# Patient Record
Sex: MALE | Race: WHITE | NOT HISPANIC OR LATINO | URBAN - METROPOLITAN AREA
[De-identification: names, ages, dates, MRNs, and addresses within clinical notes are randomized per-mention and may not be internally consistent; named-entity substitution may affect disease eponyms.]

---

## 2017-04-19 ENCOUNTER — EMERGENCY (EMERGENCY)
Facility: HOSPITAL | Age: 37
LOS: 1 days | Discharge: HOME | End: 2017-04-19
Attending: EMERGENCY MEDICINE
Payer: COMMERCIAL

## 2017-04-19 VITALS
OXYGEN SATURATION: 99 % | RESPIRATION RATE: 17 BRPM | SYSTOLIC BLOOD PRESSURE: 138 MMHG | HEART RATE: 86 BPM | DIASTOLIC BLOOD PRESSURE: 92 MMHG

## 2017-04-19 VITALS
SYSTOLIC BLOOD PRESSURE: 132 MMHG | HEIGHT: 73 IN | WEIGHT: 190.04 LBS | HEART RATE: 72 BPM | RESPIRATION RATE: 18 BRPM | DIASTOLIC BLOOD PRESSURE: 95 MMHG | OXYGEN SATURATION: 99 % | TEMPERATURE: 98 F

## 2017-04-19 DIAGNOSIS — F41.9 ANXIETY DISORDER, UNSPECIFIED: ICD-10-CM

## 2017-04-19 DIAGNOSIS — R07.9 CHEST PAIN, UNSPECIFIED: ICD-10-CM

## 2017-04-19 DIAGNOSIS — I10 ESSENTIAL (PRIMARY) HYPERTENSION: ICD-10-CM

## 2017-04-19 LAB
ANION GAP SERPL CALC-SCNC: 6 MMOL/L — SIGNIFICANT CHANGE UP (ref 5–17)
BASOPHILS # BLD AUTO: 0.1 K/UL — SIGNIFICANT CHANGE UP (ref 0–0.2)
BASOPHILS NFR BLD AUTO: 1.3 % — SIGNIFICANT CHANGE UP (ref 0–2)
BUN SERPL-MCNC: 14 MG/DL — SIGNIFICANT CHANGE UP (ref 7–18)
CALCIUM SERPL-MCNC: 9.4 MG/DL — SIGNIFICANT CHANGE UP (ref 8.4–10.5)
CHLORIDE SERPL-SCNC: 101 MMOL/L — SIGNIFICANT CHANGE UP (ref 96–108)
CO2 SERPL-SCNC: 30 MMOL/L — SIGNIFICANT CHANGE UP (ref 22–31)
CREAT SERPL-MCNC: 1.18 MG/DL — SIGNIFICANT CHANGE UP (ref 0.5–1.3)
EOSINOPHIL # BLD AUTO: 0.2 K/UL — SIGNIFICANT CHANGE UP (ref 0–0.5)
EOSINOPHIL NFR BLD AUTO: 2 % — SIGNIFICANT CHANGE UP (ref 0–6)
GLUCOSE SERPL-MCNC: 111 MG/DL — HIGH (ref 70–99)
HCT VFR BLD CALC: 48.7 % — SIGNIFICANT CHANGE UP (ref 39–50)
HGB BLD-MCNC: 15.9 G/DL — SIGNIFICANT CHANGE UP (ref 13–17)
LYMPHOCYTES # BLD AUTO: 1.7 K/UL — SIGNIFICANT CHANGE UP (ref 1–3.3)
LYMPHOCYTES # BLD AUTO: 20.7 % — SIGNIFICANT CHANGE UP (ref 13–44)
MCHC RBC-ENTMCNC: 30.2 PG — SIGNIFICANT CHANGE UP (ref 27–34)
MCHC RBC-ENTMCNC: 32.6 GM/DL — SIGNIFICANT CHANGE UP (ref 32–36)
MCV RBC AUTO: 92.6 FL — SIGNIFICANT CHANGE UP (ref 80–100)
MONOCYTES # BLD AUTO: 0.8 K/UL — SIGNIFICANT CHANGE UP (ref 0–0.9)
MONOCYTES NFR BLD AUTO: 9.4 % — SIGNIFICANT CHANGE UP (ref 2–14)
NEUTROPHILS # BLD AUTO: 5.6 K/UL — SIGNIFICANT CHANGE UP (ref 1.8–7.4)
NEUTROPHILS NFR BLD AUTO: 66.5 % — SIGNIFICANT CHANGE UP (ref 43–77)
PLATELET # BLD AUTO: 304 K/UL — SIGNIFICANT CHANGE UP (ref 150–400)
POTASSIUM SERPL-MCNC: 4.2 MMOL/L — SIGNIFICANT CHANGE UP (ref 3.5–5.3)
POTASSIUM SERPL-SCNC: 4.2 MMOL/L — SIGNIFICANT CHANGE UP (ref 3.5–5.3)
RBC # BLD: 5.27 M/UL — SIGNIFICANT CHANGE UP (ref 4.2–5.8)
RBC # FLD: 12.4 % — SIGNIFICANT CHANGE UP (ref 10.3–14.5)
SODIUM SERPL-SCNC: 137 MMOL/L — SIGNIFICANT CHANGE UP (ref 135–145)
TROPONIN I SERPL-MCNC: <0.015 NG/ML — SIGNIFICANT CHANGE UP (ref 0–0.04)
TROPONIN I SERPL-MCNC: <0.015 NG/ML — SIGNIFICANT CHANGE UP (ref 0–0.04)
WBC # BLD: 8.4 K/UL — SIGNIFICANT CHANGE UP (ref 3.8–10.5)
WBC # FLD AUTO: 8.4 K/UL — SIGNIFICANT CHANGE UP (ref 3.8–10.5)

## 2017-04-19 PROCEDURE — 99283 EMERGENCY DEPT VISIT LOW MDM: CPT | Mod: 25

## 2017-04-19 PROCEDURE — 93005 ELECTROCARDIOGRAM TRACING: CPT

## 2017-04-19 PROCEDURE — 71020: CPT | Mod: 26

## 2017-04-19 PROCEDURE — 80048 BASIC METABOLIC PNL TOTAL CA: CPT

## 2017-04-19 PROCEDURE — 84484 ASSAY OF TROPONIN QUANT: CPT

## 2017-04-19 PROCEDURE — 85027 COMPLETE CBC AUTOMATED: CPT

## 2017-04-19 PROCEDURE — 71046 X-RAY EXAM CHEST 2 VIEWS: CPT

## 2017-04-19 PROCEDURE — 99285 EMERGENCY DEPT VISIT HI MDM: CPT

## 2017-04-19 RX ADMIN — Medication 0.5 MILLIGRAM(S): at 16:15

## 2017-04-19 NOTE — ED PROVIDER NOTE - PROGRESS NOTE DETAILS
Patient became very anxious and shaky while waiting for results, given ativan, feels better, 2 negative troponins.

## 2017-04-19 NOTE — ED PROVIDER NOTE - MEDICAL DECISION MAKING DETAILS
38 y/o M pt hx of HTN, no family hx of CAD w/ burning chest pain for about 20 minutes, now resolved in ED. Normal vitals, exam and EKG. Will do 2 troponin rule out. 36 y/o M pt hx of HTN, no family hx of CAD w/ burning chest pain for about 20 minutes, now resolved in ED. Normal vitals, exam and EKG. Will do 2 troponin rule out. f/u with PMD, given copy of results.

## 2017-04-19 NOTE — ED ADULT NURSE NOTE - OBJECTIVE STATEMENT
Received pt from triage with c/o chest pain.. Pt is a/o x 3, cooperative. Denies SOB. Seen by MD. All blood works sent to lab.

## 2017-04-19 NOTE — ED PROVIDER NOTE - OBJECTIVE STATEMENT
38 y/o M pt w/ PMHx of HTN presents to the ED c/o chest pain. Pt states that he was walking this morning when he began to feel burning chest pain that lasted about 25 minutes. Denies radiation of pain, fever, chills, cough, shortness of breath or any other complaints. NKDA. Pt on aspirin.

## 2017-10-15 ENCOUNTER — EMERGENCY (EMERGENCY)
Facility: HOSPITAL | Age: 37
LOS: 0 days | Discharge: LEFT BEFORE DISCHARGE | End: 2017-10-16
Payer: COMMERCIAL

## 2017-10-15 VITALS
OXYGEN SATURATION: 99 % | SYSTOLIC BLOOD PRESSURE: 158 MMHG | DIASTOLIC BLOOD PRESSURE: 96 MMHG | WEIGHT: 195.11 LBS | TEMPERATURE: 98 F | RESPIRATION RATE: 20 BRPM | HEART RATE: 100 BPM | HEIGHT: 73 IN

## 2017-10-15 DIAGNOSIS — R07.9 CHEST PAIN, UNSPECIFIED: ICD-10-CM
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
## 2017-10-16 PROCEDURE — 93010 ELECTROCARDIOGRAM REPORT: CPT
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
## 2019-05-14 PROBLEM — Z00.00 ENCOUNTER FOR PREVENTIVE HEALTH EXAMINATION: Status: ACTIVE | Noted: 2019-05-14

## 2019-09-03 ENCOUNTER — APPOINTMENT (OUTPATIENT)
Dept: ENDOCRINOLOGY | Facility: CLINIC | Age: 39
End: 2019-09-03

## 2020-12-13 ENCOUNTER — EMERGENCY (EMERGENCY)
Facility: HOSPITAL | Age: 40
LOS: 1 days | Discharge: HOME | End: 2020-12-13
Attending: EMERGENCY MEDICINE
Payer: COMMERCIAL

## 2020-12-13 VITALS
RESPIRATION RATE: 20 BRPM | WEIGHT: 195.11 LBS | HEIGHT: 73 IN | TEMPERATURE: 98 F | HEART RATE: 78 BPM | SYSTOLIC BLOOD PRESSURE: 134 MMHG | DIASTOLIC BLOOD PRESSURE: 91 MMHG | OXYGEN SATURATION: 97 %

## 2020-12-13 PROCEDURE — 73610 X-RAY EXAM OF ANKLE: CPT | Mod: 26,LT

## 2020-12-13 PROCEDURE — 99283 EMERGENCY DEPT VISIT LOW MDM: CPT

## 2020-12-13 PROCEDURE — 90471 IMMUNIZATION ADMIN: CPT

## 2020-12-13 PROCEDURE — 73610 X-RAY EXAM OF ANKLE: CPT

## 2020-12-13 PROCEDURE — 99284 EMERGENCY DEPT VISIT MOD MDM: CPT | Mod: 25

## 2020-12-13 PROCEDURE — 90715 TDAP VACCINE 7 YRS/> IM: CPT

## 2020-12-13 PROCEDURE — 73630 X-RAY EXAM OF FOOT: CPT

## 2020-12-13 PROCEDURE — 73630 X-RAY EXAM OF FOOT: CPT | Mod: 26,LT

## 2020-12-13 RX ORDER — TETANUS TOXOID, REDUCED DIPHTHERIA TOXOID AND ACELLULAR PERTUSSIS VACCINE, ADSORBED 5; 2.5; 8; 8; 2.5 [IU]/.5ML; [IU]/.5ML; UG/.5ML; UG/.5ML; UG/.5ML
0.5 SUSPENSION INTRAMUSCULAR ONCE
Refills: 0 | Status: COMPLETED | OUTPATIENT
Start: 2020-12-13 | End: 2020-12-13

## 2020-12-13 RX ADMIN — TETANUS TOXOID, REDUCED DIPHTHERIA TOXOID AND ACELLULAR PERTUSSIS VACCINE, ADSORBED 0.5 MILLILITER(S): 5; 2.5; 8; 8; 2.5 SUSPENSION INTRAMUSCULAR at 18:18
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
## 2020-12-13 NOTE — ED PROVIDER NOTE - PATIENT PORTAL LINK FT
You can access the FollowMyHealth Patient Portal offered by [facility] by registering at the following website: [email or web address]. By joining [facility]’s FollowMyHealth portal, you will also be able to view your health information using other applications (apps) compatible with our system.

## 2020-12-13 NOTE — ED PROVIDER NOTE - CLINICAL SUMMARY MEDICAL DECISION MAKING FREE TEXT BOX
Based on exam and history likely [id number], possible facture, will xray. Patient declined analgesia at this time, will update tetanus

## 2020-12-13 NOTE — ED PROVIDER NOTE - PHYSICAL EXAMINATION
No deformity, +5 strength on plantar flexion, +2 PP, some mild erythema over the 1st metatarsal, no point tenderness

## 2020-12-13 NOTE — ED PROVIDER NOTE - PROGRESS NOTE DETAILS
Provided fracture monae for comfort. Pt is well appearing walking with steady gait, stable for discharge and follow up without fail with medical doctor. I had a detailed discussion with the patient and/or guardian regarding the historical points, exam findings, and any diagnostic results supporting the discharge diagnosis. Pt educated on care and need for follow up. Strict return instructions and red flag signs and symptoms discussed with patient. Questions answered. Pt shows understanding of discharge information and agrees to follow.

## 2021-12-11 ENCOUNTER — INPATIENT (INPATIENT)
Facility: HOSPITAL | Age: 41
LOS: 1 days | Discharge: HOME | DRG: 310 | End: 2021-12-13
Attending: INTERNAL MEDICINE | Admitting: INTERNAL MEDICINE
Payer: MEDICAID

## 2021-12-11 VITALS
WEIGHT: 199.96 LBS | RESPIRATION RATE: 16 BRPM | OXYGEN SATURATION: 99 % | TEMPERATURE: 97 F | HEIGHT: 73 IN | SYSTOLIC BLOOD PRESSURE: 114 MMHG | DIASTOLIC BLOOD PRESSURE: 88 MMHG | HEART RATE: 114 BPM

## 2021-12-11 DIAGNOSIS — I48.91 UNSPECIFIED ATRIAL FIBRILLATION: ICD-10-CM

## 2021-12-11 DIAGNOSIS — Z29.9 ENCOUNTER FOR PROPHYLACTIC MEASURES, UNSPECIFIED: ICD-10-CM

## 2021-12-11 DIAGNOSIS — N17.9 ACUTE KIDNEY FAILURE, UNSPECIFIED: ICD-10-CM

## 2021-12-11 DIAGNOSIS — Z98.890 OTHER SPECIFIED POSTPROCEDURAL STATES: Chronic | ICD-10-CM

## 2021-12-11 DIAGNOSIS — F41.9 ANXIETY DISORDER, UNSPECIFIED: ICD-10-CM

## 2021-12-11 DIAGNOSIS — I10 ESSENTIAL (PRIMARY) HYPERTENSION: ICD-10-CM

## 2021-12-11 DIAGNOSIS — F10.10 ALCOHOL ABUSE, UNCOMPLICATED: ICD-10-CM

## 2021-12-11 LAB
ALBUMIN SERPL ELPH-MCNC: 3.2 G/DL — LOW (ref 3.5–5)
ALP SERPL-CCNC: 65 U/L — SIGNIFICANT CHANGE UP (ref 40–120)
ALT FLD-CCNC: 56 U/L DA — SIGNIFICANT CHANGE UP (ref 10–60)
AMPHET UR-MCNC: NEGATIVE — SIGNIFICANT CHANGE UP
ANION GAP SERPL CALC-SCNC: 9 MMOL/L — SIGNIFICANT CHANGE UP (ref 5–17)
APPEARANCE UR: CLEAR — SIGNIFICANT CHANGE UP
APTT BLD: 30.8 SEC — SIGNIFICANT CHANGE UP (ref 27.5–35.5)
AST SERPL-CCNC: 40 U/L — SIGNIFICANT CHANGE UP (ref 10–40)
BACTERIA # UR AUTO: NEGATIVE /HPF — SIGNIFICANT CHANGE UP
BARBITURATES UR SCN-MCNC: NEGATIVE — SIGNIFICANT CHANGE UP
BASOPHILS # BLD AUTO: 0.06 K/UL — SIGNIFICANT CHANGE UP (ref 0–0.2)
BASOPHILS NFR BLD AUTO: 0.7 % — SIGNIFICANT CHANGE UP (ref 0–2)
BENZODIAZ UR-MCNC: POSITIVE
BILIRUB SERPL-MCNC: 0.5 MG/DL — SIGNIFICANT CHANGE UP (ref 0.2–1.2)
BILIRUB UR-MCNC: NEGATIVE — SIGNIFICANT CHANGE UP
BUN SERPL-MCNC: 11 MG/DL — SIGNIFICANT CHANGE UP (ref 7–18)
CALCIUM SERPL-MCNC: 9.1 MG/DL — SIGNIFICANT CHANGE UP (ref 8.4–10.5)
CHLORIDE SERPL-SCNC: 101 MMOL/L — SIGNIFICANT CHANGE UP (ref 96–108)
CO2 SERPL-SCNC: 26 MMOL/L — SIGNIFICANT CHANGE UP (ref 22–31)
COCAINE METAB.OTHER UR-MCNC: NEGATIVE — SIGNIFICANT CHANGE UP
COLOR SPEC: YELLOW — SIGNIFICANT CHANGE UP
CREAT SERPL-MCNC: 1.31 MG/DL — HIGH (ref 0.5–1.3)
DIFF PNL FLD: NEGATIVE — SIGNIFICANT CHANGE UP
EOSINOPHIL # BLD AUTO: 0.11 K/UL — SIGNIFICANT CHANGE UP (ref 0–0.5)
EOSINOPHIL NFR BLD AUTO: 1.4 % — SIGNIFICANT CHANGE UP (ref 0–6)
EPI CELLS # UR: NEGATIVE /HPF — SIGNIFICANT CHANGE UP
ETHANOL SERPL-MCNC: <3 MG/DL — SIGNIFICANT CHANGE UP (ref 0–10)
GLUCOSE SERPL-MCNC: 130 MG/DL — HIGH (ref 70–99)
GLUCOSE UR QL: NEGATIVE — SIGNIFICANT CHANGE UP
HCT VFR BLD CALC: 44.9 % — SIGNIFICANT CHANGE UP (ref 39–50)
HGB BLD-MCNC: 15 G/DL — SIGNIFICANT CHANGE UP (ref 13–17)
IMM GRANULOCYTES NFR BLD AUTO: 0.5 % — SIGNIFICANT CHANGE UP (ref 0–1.5)
INR BLD: 0.96 RATIO — SIGNIFICANT CHANGE UP (ref 0.88–1.16)
KETONES UR-MCNC: ABNORMAL
LEUKOCYTE ESTERASE UR-ACNC: NEGATIVE — SIGNIFICANT CHANGE UP
LIDOCAIN IGE QN: 81 U/L — SIGNIFICANT CHANGE UP (ref 73–393)
LYMPHOCYTES # BLD AUTO: 1.25 K/UL — SIGNIFICANT CHANGE UP (ref 1–3.3)
LYMPHOCYTES # BLD AUTO: 15.4 % — SIGNIFICANT CHANGE UP (ref 13–44)
MAGNESIUM SERPL-MCNC: 2.1 MG/DL — SIGNIFICANT CHANGE UP (ref 1.6–2.6)
MCHC RBC-ENTMCNC: 31 PG — SIGNIFICANT CHANGE UP (ref 27–34)
MCHC RBC-ENTMCNC: 33.4 GM/DL — SIGNIFICANT CHANGE UP (ref 32–36)
MCV RBC AUTO: 92.8 FL — SIGNIFICANT CHANGE UP (ref 80–100)
METHADONE UR-MCNC: NEGATIVE — SIGNIFICANT CHANGE UP
MONOCYTES # BLD AUTO: 0.97 K/UL — HIGH (ref 0–0.9)
MONOCYTES NFR BLD AUTO: 12 % — SIGNIFICANT CHANGE UP (ref 2–14)
NEUTROPHILS # BLD AUTO: 5.67 K/UL — SIGNIFICANT CHANGE UP (ref 1.8–7.4)
NEUTROPHILS NFR BLD AUTO: 70 % — SIGNIFICANT CHANGE UP (ref 43–77)
NITRITE UR-MCNC: NEGATIVE — SIGNIFICANT CHANGE UP
NRBC # BLD: 0 /100 WBCS — SIGNIFICANT CHANGE UP (ref 0–0)
OPIATES UR-MCNC: NEGATIVE — SIGNIFICANT CHANGE UP
PCP SPEC-MCNC: SIGNIFICANT CHANGE UP
PCP UR-MCNC: NEGATIVE — SIGNIFICANT CHANGE UP
PH UR: 5 — SIGNIFICANT CHANGE UP (ref 5–8)
PLATELET # BLD AUTO: 222 K/UL — SIGNIFICANT CHANGE UP (ref 150–400)
POTASSIUM SERPL-MCNC: 4.1 MMOL/L — SIGNIFICANT CHANGE UP (ref 3.5–5.3)
POTASSIUM SERPL-SCNC: 4.1 MMOL/L — SIGNIFICANT CHANGE UP (ref 3.5–5.3)
PROT SERPL-MCNC: 6.9 G/DL — SIGNIFICANT CHANGE UP (ref 6–8.3)
PROT UR-MCNC: 15
PROTHROM AB SERPL-ACNC: 11.4 SEC — SIGNIFICANT CHANGE UP (ref 10.6–13.6)
RBC # BLD: 4.84 M/UL — SIGNIFICANT CHANGE UP (ref 4.2–5.8)
RBC # FLD: 13.2 % — SIGNIFICANT CHANGE UP (ref 10.3–14.5)
RBC CASTS # UR COMP ASSIST: SIGNIFICANT CHANGE UP /HPF (ref 0–2)
SARS-COV-2 RNA SPEC QL NAA+PROBE: SIGNIFICANT CHANGE UP
SODIUM SERPL-SCNC: 136 MMOL/L — SIGNIFICANT CHANGE UP (ref 135–145)
SP GR SPEC: 1.01 — SIGNIFICANT CHANGE UP (ref 1.01–1.02)
T3 SERPL-MCNC: 80 NG/DL — SIGNIFICANT CHANGE UP (ref 80–200)
T4 AB SER-ACNC: 6.7 UG/DL — SIGNIFICANT CHANGE UP (ref 4.6–12)
THC UR QL: NEGATIVE — SIGNIFICANT CHANGE UP
TROPONIN I, HIGH SENSITIVITY RESULT: 6.2 NG/L — SIGNIFICANT CHANGE UP
TSH SERPL-MCNC: 2.45 UU/ML — SIGNIFICANT CHANGE UP (ref 0.34–4.82)
UROBILINOGEN FLD QL: NEGATIVE — SIGNIFICANT CHANGE UP
WBC # BLD: 8.1 K/UL — SIGNIFICANT CHANGE UP (ref 3.8–10.5)
WBC # FLD AUTO: 8.1 K/UL — SIGNIFICANT CHANGE UP (ref 3.8–10.5)
WBC UR QL: SIGNIFICANT CHANGE UP /HPF (ref 0–5)

## 2021-12-11 PROCEDURE — 71045 X-RAY EXAM CHEST 1 VIEW: CPT | Mod: 26

## 2021-12-11 PROCEDURE — 99291 CRITICAL CARE FIRST HOUR: CPT

## 2021-12-11 RX ORDER — METOPROLOL TARTRATE 50 MG
5 TABLET ORAL ONCE
Refills: 0 | Status: COMPLETED | OUTPATIENT
Start: 2021-12-11 | End: 2021-12-11

## 2021-12-11 RX ORDER — HEPARIN SODIUM 5000 [USP'U]/ML
5000 INJECTION INTRAVENOUS; SUBCUTANEOUS EVERY 8 HOURS
Refills: 0 | Status: DISCONTINUED | OUTPATIENT
Start: 2021-12-11 | End: 2021-12-11

## 2021-12-11 RX ORDER — ALPRAZOLAM 0.25 MG
0.25 TABLET ORAL THREE TIMES A DAY
Refills: 0 | Status: DISCONTINUED | OUTPATIENT
Start: 2021-12-11 | End: 2021-12-13

## 2021-12-11 RX ORDER — ASPIRIN/CALCIUM CARB/MAGNESIUM 324 MG
81 TABLET ORAL DAILY
Refills: 0 | Status: DISCONTINUED | OUTPATIENT
Start: 2021-12-11 | End: 2021-12-13

## 2021-12-11 RX ORDER — ASPIRIN/CALCIUM CARB/MAGNESIUM 324 MG
162 TABLET ORAL ONCE
Refills: 0 | Status: COMPLETED | OUTPATIENT
Start: 2021-12-11 | End: 2021-12-11

## 2021-12-11 RX ORDER — METOPROLOL TARTRATE 50 MG
50 TABLET ORAL
Refills: 0 | Status: DISCONTINUED | OUTPATIENT
Start: 2021-12-11 | End: 2021-12-13

## 2021-12-11 RX ORDER — ACETAMINOPHEN 500 MG
650 TABLET ORAL EVERY 6 HOURS
Refills: 0 | Status: DISCONTINUED | OUTPATIENT
Start: 2021-12-11 | End: 2021-12-13

## 2021-12-11 RX ORDER — HEPARIN SODIUM 5000 [USP'U]/ML
1700 INJECTION INTRAVENOUS; SUBCUTANEOUS
Qty: 25000 | Refills: 0 | Status: DISCONTINUED | OUTPATIENT
Start: 2021-12-11 | End: 2021-12-11

## 2021-12-11 RX ORDER — ONDANSETRON 8 MG/1
4 TABLET, FILM COATED ORAL EVERY 6 HOURS
Refills: 0 | Status: DISCONTINUED | OUTPATIENT
Start: 2021-12-11 | End: 2021-12-13

## 2021-12-11 RX ORDER — ENOXAPARIN SODIUM 100 MG/ML
90 INJECTION SUBCUTANEOUS EVERY 12 HOURS
Refills: 0 | Status: DISCONTINUED | OUTPATIENT
Start: 2021-12-12 | End: 2021-12-13

## 2021-12-11 RX ORDER — SODIUM CHLORIDE 9 MG/ML
1000 INJECTION INTRAMUSCULAR; INTRAVENOUS; SUBCUTANEOUS
Refills: 0 | Status: DISCONTINUED | OUTPATIENT
Start: 2021-12-11 | End: 2021-12-13

## 2021-12-11 RX ORDER — ENOXAPARIN SODIUM 100 MG/ML
90 INJECTION SUBCUTANEOUS ONCE
Refills: 0 | Status: COMPLETED | OUTPATIENT
Start: 2021-12-11 | End: 2021-12-11

## 2021-12-11 RX ADMIN — Medication 162 MILLIGRAM(S): at 13:02

## 2021-12-11 RX ADMIN — Medication 0.5 MILLIGRAM(S): at 15:10

## 2021-12-11 RX ADMIN — Medication 50 MILLIGRAM(S): at 18:29

## 2021-12-11 RX ADMIN — ENOXAPARIN SODIUM 90 MILLIGRAM(S): 100 INJECTION SUBCUTANEOUS at 13:29

## 2021-12-11 RX ADMIN — Medication 5 MILLIGRAM(S): at 15:10

## 2021-12-11 NOTE — H&P ADULT - NSHPLABSRESULTS_GEN_ALL_CORE
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 Lives alone, w/ son on weekends. Denies smoking or illict drug use, recent increase in alcohol use d/t break up >3 drinks/day.

## 2021-12-11 NOTE — ED PROVIDER NOTE - NSICDXPASTMEDICALHX_GEN_ALL_CORE_FT
PAST MEDICAL HISTORY:  HTN (hypertension)      PAST MEDICAL HISTORY:  HTN (hypertension)       Anxiety

## 2021-12-11 NOTE — H&P ADULT - PROBLEM SELECTOR PLAN 1
Pt p/w palpitations  Afib on EKG   on Metoprolol 50mg BID at home   CHADsVasc = 1 for HTN  f/u Echo   Cardio Consulted Pt p/w palpitations  Afib on EKG   on Metoprolol 50mg BID at home   TSH, T4 wnl  CHADsVasc = 1 for HTN  f/u A1c and lipid panel  f/u Echo   c/w FD AC for now until echo, and AM labs for DM come back as he might have higher   Cardio Bethanie Consulted Pt p/w palpitations  Afib on EKG   admit to tele   on Metoprolol 50mg BID at home   TSH, T4 wnl  CHADsVasc = 1 for HTN  f/u A1c and lipid panel  f/u Echo   c/w FD AC for now until echo, and AM labs for DM come back as he might have higher   Cardio Bethanie Consulted

## 2021-12-11 NOTE — CHART NOTE - NSCHARTNOTEFT_GEN_A_CORE
EVENT: Pt concerned about LOS since he is uninsured and due to start a new job on Monday with insurance kicking in thereafter    BRIEF HPI: 41 year old male, with PMH of HTN and anxiety, who presented to the ED due to palpitations. Patient was lying in bed watching TV with his son when he started feeling his heart beat very fast. His apple watch told him he his heart rate was 180s-190s and that he is in Afib. He also began feeling short of breath that he attributes to anxiety. Patient denies headache, dizziness, chest pain, n/v, or  complaints but does endorse some anxiety-related diarrhea. Pt has had on and off episodes of palpitations since september. He called EMS on a previous occasion but his symptoms resolved and EKG was normal by the time they arrived. 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 Pt is vaccinated for COVID-19.       OBJECTIVE:  Vital Signs Last 24 Hrs  T(C): 36.7 (11 Dec 2021 15:00), Max: 36.7 (11 Dec 2021 15:00)  T(F): 98 (11 Dec 2021 15:00), Max: 98 (11 Dec 2021 15:00)  HR: 104 (11 Dec 2021 19:15) (104 - 128)  BP: 105/64 (11 Dec 2021 19:15) (105/64 - 117/84)  BP(mean): --  RR: 18 (11 Dec 2021 15:00) (16 - 18)  SpO2: 98% (11 Dec 2021 15:00) (98% - 99%)    FOCUSED PHYSICAL EXAM:    LABS:                        15.0   8.10  )-----------( 222      ( 11 Dec 2021 12:48 )             44.9     12-11    136  |  101  |  11  ----------------------------<  130<H>  4.1   |  26  |  1.31<H>    Ca    9.1      11 Dec 2021 12:48  Mg     2.1     12-11    TPro  6.9  /  Alb  3.2<L>  /  TBili  0.5  /  DBili  x   /  AST  40  /  ALT  56  /  AlkPhos  65  12-11      EKG:   IMGAGING:    ASSESSMENT:  HPI:  Patient is a 41 year old male, with PMH of HTN and anxiety, who presented to the ED due to palpitations. Patient was lying in bed watching TV with his son when he started feeling his heart beat very fast. His apple watch told him he his heart rate was 180s-190s and that he is in Afib. He also began feeling short of breath that he attributes to anxiety. Patient denies headache, dizziness, chest pain, n/v, or  complaints but does endorse some anxiety-related diarrhea. Pt has had on and off episodes of palpitations since september. He called EMS on a previous occasion but his symptoms resolved and EKG was normal by the time they arrived. 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 Pt is vaccinated for COVID-19.     In ED:  EKG shows AFib  Pt tachycardic to 140s   vitals otherwise wnl  (11 Dec 2021 17:21)      PLAN:     FOLLOW UP / RESULT: EVENT: Pt concerned about hospitalization days since he is uninsured and due to start a new job on Monday with insurance kicking in thereafter.    BRIEF HPI: 41 year old male, with PMH of HTN and anxiety, who presented to the ED due to palpitations. Patient was lying in bed watching TV with his son when he started feeling his heart beat very fast. His apple watch told him he his heart rate was 180s-190s and that he is in Afib. He also began feeling short of breath that he attributes to anxiety. Patient denies headache, dizziness, chest pain, n/v, or  complaints but does endorse some anxiety-related diarrhea. Pt has had on and off episodes of palpitations since september. He called EMS on a previous occasion but his symptoms resolved and EKG was normal by the time they arrived. 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 Pt is vaccinated for COVID-19.     OBJECTIVE:  Vital Signs Last 24 Hrs  T(C): 36.7 (11 Dec 2021 15:00), Max: 36.7 (11 Dec 2021 15:00)  T(F): 98 (11 Dec 2021 15:00), Max: 98 (11 Dec 2021 15:00)  HR: 104 (11 Dec 2021 19:15) (104 - 128)  BP: 105/64 (11 Dec 2021 19:15) (105/64 - 117/84)  BP(mean): --  RR: 18 (11 Dec 2021 15:00) (16 - 18)  SpO2: 98% (11 Dec 2021 15:00) (98% - 99%)    FOCUSED PHYSICAL EXAM:  CV: S1 S2, irregular, Afib  RESP: Even, unlabored  NEURO: Alert, oriented X 4    LABS:                        15.0   8.10  )-----------( 222      ( 11 Dec 2021 12:48 )             44.9     12-11    136  |  101  |  11  ----------------------------<  130<H>  4.1   |  26  |  1.31<H>    Ca    9.1      11 Dec 2021 12:48  Mg     2.1     12-11    TPro  6.9  /  Alb  3.2<L>  /  TBili  0.5  /  DBili  x   /  AST  40  /  ALT  56  /  AlkPhos  65  12-11    Rhythm strip: 12/12/21  00:25 Afib MVR HR 86    PROBLEM: Plan of care discussed with pt regarding the importance of telemetry monitoring and  completing cardiac work up. ECHO is pending. Verbalized understanding but states the job that is scheduled to start on Monday is of utmost importance and he would have to sign AMA  PLAN:   1. ECHO completion  2. Cardiac monitoring    FOLLOW UP / RESULT: Echo results, Dr Kovacs cardio

## 2021-12-11 NOTE — ED PROVIDER NOTE - NSICDXPASTSURGICALHX_GEN_ALL_CORE_FT
PAST SURGICAL HISTORY:  S/P inguinal hernia repair      PAST SURGICAL HISTORY:  S/P inguinal hernia repair

## 2021-12-11 NOTE — H&P ADULT - PROBLEM SELECTOR PLAN 3
on 40mg lisnopril and 50mg metoprolol BID at home   hold lisnopril d/t EDNA  c/w home dose of metoprolol for now on 40mg lisnopril and 50mg metoprolol BID at home   hold lisnopril d/t EDNA  c/w home dose of metoprolol for now  consider adding another med if fore breakthrough htn on 40mg lisnopril and 50mg metoprolol BID at home   hold lisinopril d/t EDNA  c/w home dose of metoprolol for now  consider adding another med if fore breakthrough htn cr 1.31 on admission  unknown baseline  home lisinopril held   c/w IV fluids

## 2021-12-11 NOTE — H&P ADULT - HISTORY OF PRESENT ILLNESS
Patient is a 38 year old male, with PMH of HTN and anxiety, who presented to the ED due to palpitations.  Patient is a 41 year old male, with PMH of HTN and anxiety, who presented to the ED due to palpitations. Patient was lying in bed watching TV with his son when he started feeling his heart beat very fast. His apple watch told him he his heart rate was 180s-190s and that he is in Afib. He also began feeling short of breath that he attributes to anxiety. Patient denies headache, dizziness, chest pain, n/v, or  complaints but does endorse some anxiety-related diarrhea. Pt recently started drinking more (3 glasses of wine per night) as he recently went through a rough break up and is starting a new job which has caused him some stress. Pt is vaccinated for COVID-19.  Patient is a 41 year old male, with PMH of HTN and anxiety, who presented to the ED due to palpitations. Patient was lying in bed watching TV with his son when he started feeling his heart beat very fast. His apple watch told him he his heart rate was 180s-190s and that he is in Afib. He also began feeling short of breath that he attributes to anxiety. Patient denies headache, dizziness, chest pain, n/v, or  complaints but does endorse some anxiety-related diarrhea. Pt has had on and off episodes of palpitations since september. He called EMS on a previous occasion but his symptoms resolved and EKG was normal by the time they arrived. 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 Pt is vaccinated for COVID-19.     In ED:  EKG shows AFib  Pt tachycardic to 140s   vitals otherwise wnl  Patient is a 41 year old male, with PMH of HTN and anxiety, who presented to the ED due to palpitations. Patient was lying in bed watching TV with his son when he started feeling his heart beat very fast. His apple watch told him he his heart rate was 180s-190s and that he is in Afib. He also began feeling short of breath that he attributes to anxiety. Patient denies headache, dizziness, chest pain, n/v, or  complaints but does endorse some anxiety-related diarrhea. Pt has had on and off episodes of palpitations since september. He called EMS on a previous occasion but his symptoms resolved and EKG was normal by the time they arrived. 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 Pt is vaccinated for COVID-19.     In ED:  EKG shows AFib  Pt tachycardic to 140s   vitals otherwise wnl

## 2021-12-11 NOTE — ED PROVIDER NOTE - OBJECTIVE STATEMENT
41 year old male with PMHx of hypertension and anxiety and PSHx of inguinal hernia repair surgery presents to the ED with complaints of irregular and fast heart rate. The patient states that he was laying in bed with his son watching TV when his Apple watch alerted him that he had an irregular and fast heart rate and another dewayne notified him that he was in atrial fibrillation. He also reports having shortness of breath. He stats that he had an episode of a fib in September but that the symptoms subsided before EMS arrived. The patient reports that he recently went through a break and has been drinking more than usual as a result, with his last drink being three glasses of wine last night. The patient also reports that he has been vaccinated for COVID-19. He denies any chest pain at this time. NKDA. 41 year old male with PMHx of hypertension and anxiety and PSHx of inguinal hernia repair surgery presents to the ED with complaints of irregular and fast heart rate. The patient states that he was laying in bed with his son watching TV when his Apple watch alerted him that he had an irregular and fast heart rate and another dewayne notified him that he was in atrial fibrillation. He also reports having shortness of breath. He stats that he had an episode of a fib in September but that the symptoms subsided before EMS arrived. The patient reports that he recently went through a break and has been drinking more than usual as a result, with his last drink being three glasses of wine last night. The patient also reports that he has been vaccinated for COVID-19. He denies any chest pain at this time. NKDA.  Pt took Metoprolol 75mg this am, denies any cough.  Pt took Xanax .25mg this am 2/2 feeling anxious

## 2021-12-11 NOTE — H&P ADULT - ATTENDING COMMENTS
41 years old male with PMH of Alcohol Abuse,HTN and Anxiety is under lot of stress as  from his girl friend has been having palpitations off and On since september . Hemodynamically stable so will control ventricular rate with BB and already given Lovenox. TFT noted. Will put him on CIWA protocol and check TTE . Cards consulted. If needed will start him on Cardizem ggt.

## 2021-12-11 NOTE — H&P ADULT - ASSESSMENT
Patient is a 38 year old male, with PMH of HTN and anxiety, who presented to the ED due to palpitations.  Patient is a 41 year old male, with PMH of HTN and anxiety, who presented to the ED due to palpitations. Admitted to telemetry for new onset afib Patient is a 41 year old male, with PMH of HTN and anxiety, who presented to the ED due to palpitations. Admitted to telemetry for new onset afib.     EKG showing AFib with HR of 106. TSH normal at 2.45. UA negative. BAL negative.

## 2021-12-11 NOTE — H&P ADULT - PROBLEM SELECTOR PLAN 5
Heparin SubQ for DVT ppx FD Heparin for now, pt with EDNA  for DVT ppx FD Lovenox for now, pt with EDNA  for DVT ppx c/w home med of xanax PRN

## 2021-12-11 NOTE — H&P ADULT - NSHPPHYSICALEXAM_GEN_ALL_CORE
T(C): 36.7 (12-11-21 @ 15:00), Max: 36.7 (12-11-21 @ 15:00)  HR: 110 (12-11-21 @ 15:14) (110 - 128)  BP: 117/84 (12-11-21 @ 15:00) (114/88 - 117/84)  RR: 18 (12-11-21 @ 15:00) (16 - 18)  SpO2: 98% (12-11-21 @ 15:00) (98% - 99%)    GENERAL: patient appears well, no acute distress, appropriate, pleasant  EYES: sclera clear, no exudates  ENMT: oropharynx clear without erythema, no exudates, moist mucous membranes  NECK: supple, soft, no thyromegaly noted  LUNGS: good air entry bilaterally, clear to auscultation, symmetric breath sounds, no wheezing, rhonchi or rales appreciated  HEART: S1/S2, +Tachycardic +irregular, no murmurs noted, no lower extremity edema  GASTROINTESTINAL: abdomen is soft, nontender, nondistended, normoactive bowel sounds, no palpable masses  INTEGUMENT: good skin turgor, no lesions noted  MUSCULOSKELETAL: no clubbing or cyanosis, no obvious deformity  NEUROLOGIC: AAO x3, good muscle tone in 4 extremities, no obvious sensory deficits  PSYCHIATRIC: +anxious mood, affect is congruent, linear and logical thought process

## 2021-12-11 NOTE — H&P ADULT - PROBLEM SELECTOR PLAN 4
c/w home med of xanax PRN on 40mg lisnopril and 50mg metoprolol BID at home   hold lisinopril d/t EDNA  c/w home dose of metoprolol for now  consider adding another med if fore breakthrough htn

## 2021-12-11 NOTE — H&P ADULT - PROBLEM SELECTOR PLAN 2
cr 1.31 on admission  unknown baseline  lisinopril held   c/w IV fluids cr 1.31 on admission  unknown baseline  home lisinopril held   c/w IV fluids Pt drinks 6-9 drinks per day   CIWA protocol   Ativan 2q2 PRN   f/u social work

## 2021-12-11 NOTE — H&P ADULT - NSICDXFAMILYHX_GEN_ALL_CORE_FT
FAMILY HISTORY:  Father  Still living? Unknown  Family history of diabetes mellitus (DM), Age at diagnosis: Age Unknown  FH: HTN (hypertension), Age at diagnosis: Age Unknown    Mother  Still living? Unknown  FH: atrial fibrillation, Age at diagnosis: Age Unknown

## 2021-12-12 LAB
A1C WITH ESTIMATED AVERAGE GLUCOSE RESULT: 5.6 % — SIGNIFICANT CHANGE UP (ref 4–5.6)
ANION GAP SERPL CALC-SCNC: 8 MMOL/L — SIGNIFICANT CHANGE UP (ref 5–17)
BASOPHILS # BLD AUTO: 0.06 K/UL — SIGNIFICANT CHANGE UP (ref 0–0.2)
BASOPHILS NFR BLD AUTO: 0.6 % — SIGNIFICANT CHANGE UP (ref 0–2)
BUN SERPL-MCNC: 11 MG/DL — SIGNIFICANT CHANGE UP (ref 7–18)
CALCIUM SERPL-MCNC: 8.9 MG/DL — SIGNIFICANT CHANGE UP (ref 8.4–10.5)
CHLORIDE SERPL-SCNC: 102 MMOL/L — SIGNIFICANT CHANGE UP (ref 96–108)
CHOLEST SERPL-MCNC: 209 MG/DL — HIGH
CO2 SERPL-SCNC: 29 MMOL/L — SIGNIFICANT CHANGE UP (ref 22–31)
CREAT SERPL-MCNC: 1.11 MG/DL — SIGNIFICANT CHANGE UP (ref 0.5–1.3)
EOSINOPHIL # BLD AUTO: 0.06 K/UL — SIGNIFICANT CHANGE UP (ref 0–0.5)
EOSINOPHIL NFR BLD AUTO: 0.6 % — SIGNIFICANT CHANGE UP (ref 0–6)
ESTIMATED AVERAGE GLUCOSE: 114 MG/DL — SIGNIFICANT CHANGE UP (ref 68–114)
GLUCOSE SERPL-MCNC: 103 MG/DL — HIGH (ref 70–99)
HCT VFR BLD CALC: 47.7 % — SIGNIFICANT CHANGE UP (ref 39–50)
HDLC SERPL-MCNC: 64 MG/DL — SIGNIFICANT CHANGE UP
HGB BLD-MCNC: 15.6 G/DL — SIGNIFICANT CHANGE UP (ref 13–17)
IMM GRANULOCYTES NFR BLD AUTO: 0.7 % — SIGNIFICANT CHANGE UP (ref 0–1.5)
LIPID PNL WITH DIRECT LDL SERPL: 114 MG/DL — HIGH
LYMPHOCYTES # BLD AUTO: 1.91 K/UL — SIGNIFICANT CHANGE UP (ref 1–3.3)
LYMPHOCYTES # BLD AUTO: 20 % — SIGNIFICANT CHANGE UP (ref 13–44)
MAGNESIUM SERPL-MCNC: 2.6 MG/DL — SIGNIFICANT CHANGE UP (ref 1.6–2.6)
MCHC RBC-ENTMCNC: 30.7 PG — SIGNIFICANT CHANGE UP (ref 27–34)
MCHC RBC-ENTMCNC: 32.7 GM/DL — SIGNIFICANT CHANGE UP (ref 32–36)
MCV RBC AUTO: 93.9 FL — SIGNIFICANT CHANGE UP (ref 80–100)
MONOCYTES # BLD AUTO: 1.1 K/UL — HIGH (ref 0–0.9)
MONOCYTES NFR BLD AUTO: 11.5 % — SIGNIFICANT CHANGE UP (ref 2–14)
NEUTROPHILS # BLD AUTO: 6.33 K/UL — SIGNIFICANT CHANGE UP (ref 1.8–7.4)
NEUTROPHILS NFR BLD AUTO: 66.6 % — SIGNIFICANT CHANGE UP (ref 43–77)
NON HDL CHOLESTEROL: 145 MG/DL — HIGH
NRBC # BLD: 0 /100 WBCS — SIGNIFICANT CHANGE UP (ref 0–0)
PHOSPHATE SERPL-MCNC: 3.4 MG/DL — SIGNIFICANT CHANGE UP (ref 2.5–4.5)
PLATELET # BLD AUTO: 242 K/UL — SIGNIFICANT CHANGE UP (ref 150–400)
POTASSIUM SERPL-MCNC: 4 MMOL/L — SIGNIFICANT CHANGE UP (ref 3.5–5.3)
POTASSIUM SERPL-SCNC: 4 MMOL/L — SIGNIFICANT CHANGE UP (ref 3.5–5.3)
RBC # BLD: 5.08 M/UL — SIGNIFICANT CHANGE UP (ref 4.2–5.8)
RBC # FLD: 13.2 % — SIGNIFICANT CHANGE UP (ref 10.3–14.5)
SODIUM SERPL-SCNC: 139 MMOL/L — SIGNIFICANT CHANGE UP (ref 135–145)
TRIGL SERPL-MCNC: 157 MG/DL — HIGH
WBC # BLD: 9.53 K/UL — SIGNIFICANT CHANGE UP (ref 3.8–10.5)
WBC # FLD AUTO: 9.53 K/UL — SIGNIFICANT CHANGE UP (ref 3.8–10.5)

## 2021-12-12 RX ORDER — INFLUENZA VIRUS VACCINE 15; 15; 15; 15 UG/.5ML; UG/.5ML; UG/.5ML; UG/.5ML
0.5 SUSPENSION INTRAMUSCULAR ONCE
Refills: 0 | Status: DISCONTINUED | OUTPATIENT
Start: 2021-12-12 | End: 2021-12-13

## 2021-12-12 RX ADMIN — ENOXAPARIN SODIUM 90 MILLIGRAM(S): 100 INJECTION SUBCUTANEOUS at 14:44

## 2021-12-12 RX ADMIN — ENOXAPARIN SODIUM 90 MILLIGRAM(S): 100 INJECTION SUBCUTANEOUS at 02:31

## 2021-12-12 RX ADMIN — SODIUM CHLORIDE 80 MILLILITER(S): 9 INJECTION INTRAMUSCULAR; INTRAVENOUS; SUBCUTANEOUS at 00:31

## 2021-12-12 RX ADMIN — Medication 81 MILLIGRAM(S): at 12:19

## 2021-12-12 RX ADMIN — Medication 0.25 MILLIGRAM(S): at 19:52

## 2021-12-12 RX ADMIN — Medication 50 MILLIGRAM(S): at 17:22

## 2021-12-12 RX ADMIN — Medication 0.25 MILLIGRAM(S): at 09:29

## 2021-12-12 RX ADMIN — Medication 0.25 MILLIGRAM(S): at 00:10

## 2021-12-12 RX ADMIN — Medication 50 MILLIGRAM(S): at 07:31

## 2021-12-12 NOTE — PATIENT PROFILE ADULT - FALL HARM RISK - UNIVERSAL INTERVENTIONS
Bed in lowest position, wheels locked, appropriate side rails in place/Call bell, personal items and telephone in reach/Instruct patient to call for assistance before getting out of bed or chair/Non-slip footwear when patient is out of bed/[place] to call system/Physically safe environment - no spills, clutter or unnecessary equipment/Purposeful Proactive Rounding/Room/bathroom lighting operational, light cord in reach

## 2021-12-12 NOTE — PROGRESS NOTE ADULT - ASSESSMENT
41 year old male, with PMH of HTN and anxiety, who presented to the ED due to palpitations. Admitted to telemetry for new onset afib.     EKG showing AFib with HR of 106. TSH normal at 2.45. UA negative. BAL negative.      Problem/Plan - 1:  ·  Problem: New onset atrial fibrillation.   ·  Plan: Pt p/w palpitations  Afib on EKG   admit to tele   on Metoprolol 50mg BID at home   TSH, T4 wnl  CHADsVasc = 1 for HTN  TTE and NST pending.   Cardio Bethanie Consulted.     Problem/Plan - 2:  ·  Problem: Alcohol abuse.   ·  Plan: Pt drinks 6-9 drinks per day   CIWA protocol   Ativan 2q2 PRN   f/u social work.     Problem/Plan - 3:  ·  Problem: EDNA (acute kidney injury).   ·  Plan: cr 1.31 on admission  unknown baseline  home lisinopril held   c/w IV fluids.     Problem/Plan - 4:  ·  Problem: HTN (hypertension).   ·  Plan: on 40mg lisnopril and 50mg metoprolol BID at home   hold lisinopril d/t EDNA  c/w home dose of metoprolol for now  consider adding another med if fore breakthrough htn.     Problem/Plan - 5:  ·  Problem: Anxiety.   ·  Plan: c/w home med of xanax PRN.     Problem/Plan - 6:  ·  Problem: Need for prophylactic measure.   ·  Plan: FD Lovenox for now, pt with EDNA  for DVT ppx.      DC planning home after above tests.

## 2021-12-12 NOTE — CONSULT NOTE ADULT - SUBJECTIVE AND OBJECTIVE BOX
C A R D I O L O G Y  *********************    DATE OF SERVICE: 12-12-21    HISTORY OF PRESENT ILLNESS: HPI:  Patient is a 41 year old male, with PMH of HTN and anxiety, who presented to the ED due to palpitations. Patient was lying in bed watching TV with his son when he started feeling his heart beat very fast. His apple watch told him he his heart rate was 180s-190s and that he is in Afib. He also began feeling short of breath that he attributes to anxiety. Patient denies headache, dizziness, chest pain, n/v, or  complaints but does endorse some anxiety-related diarrhea. Pt has had on and off episodes of palpitations since september. He called EMS on a previous occasion but his symptoms resolved and EKG was normal by the time they arrived. 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 Pt is vaccinated for COVID-19.   No CP NO LOC    In ED:  EKG shows AFib  Pt tachycardic to 140s   vitals otherwise wnl  (11 Dec 2021 17:21)      PAST MEDICAL & SURGICAL HISTORY:  HTN (hypertension)    Anxiety    S/P inguinal hernia repair            MEDICATIONS:  MEDICATIONS  (STANDING):  aspirin  chewable 81 milliGRAM(s) Oral daily  enoxaparin Injectable 90 milliGRAM(s) SubCutaneous every 12 hours  influenza   Vaccine 0.5 milliLiter(s) IntraMuscular once  metoprolol tartrate 50 milliGRAM(s) Oral two times a day  sodium chloride 0.9%. 1000 milliLiter(s) (80 mL/Hr) IV Continuous <Continuous>      Allergies    No Known Allergies    Intolerances        FAMILY HISTORY:  Family history of diabetes mellitus (DM) (Father)    FH: HTN (hypertension) (Father)    FH: atrial fibrillation (Mother)      Non-contributary for premature coronary disease or sudden cardiac death    SOCIAL HISTORY:    [x ] Non-smoker  [ ] Smoker  [X ] Alcohol      REVIEW OF SYSTEMS:  [ ]chest pain  [  ]shortness of breath  [  ]palpitations  [  ]syncope  [ ]near syncope [ ]upper extremity weakness   [ ] lower extremity weakness  [  ]diplopia  [  ]altered mental status   [  ]fevers  [ ]chills [ ]nausea  [ ]vomitting  [  ]dysphagia    [ ]abdominal pain  [ ]melena  [ ]BRBPR    [  ]epistaxis  [  ]rash    [ ]lower extremity edema        [X] All others negative	  [ ] Unable to obtain      LABS:	 	    CARDIAC MARKERS:        Troponin I, High Sensitivity Result: 6.2 ng/L (12-11-21 @ 12:48)                            15.6   9.53  )-----------( 242      ( 12 Dec 2021 07:55 )             47.7     Hb Trend: 15.6<--    12-12    139  |  102  |  11  ----------------------------<  103<H>  4.0   |  29  |  1.11    Ca    8.9      12 Dec 2021 07:55  Phos  3.4     12-12  Mg     2.6     12-12    TPro  6.9  /  Alb  3.2<L>  /  TBili  0.5  /  DBili  x   /  AST  40  /  ALT  56  /  AlkPhos  65  12-11    Creatinine Trend: 1.11<--, 1.31<--        PHYSICAL EXAM:  T(C): 36.6 (12-12-21 @ 15:59), Max: 36.9 (12-12-21 @ 10:19)  HR: 71 (12-12-21 @ 15:59) (71 - 104)  BP: 126/78 (12-12-21 @ 15:59) (105/64 - 136/99)  RR: 16 (12-12-21 @ 15:59) (16 - 19)  SpO2: 98% (12-12-21 @ 15:59) (96% - 100%)  Wt(kg): --   BMI (kg/m2): 26.4 (12-11-21 @ 12:25)  I&O's Summary      Gen: Appears well in NAD  HEENT:  (-)icterus (-)pallor  CV: N S1 S2 1/6 ROBERT (+)2 Pulses B/l  Resp:  Clear to ausculatation B/L, normal effort  GI: (+) BS Soft, NT, ND  Lymph:  (-)Edema, (-)obvious lymphadenopathy  Skin: Warm to touch, Normal turgor  Psych: Appropriate mood and affect      ECG:  	Afib 107 BPM    RADIOLOGY:         CXR:   < from: Xray Chest 1 View- PORTABLE-Urgent (12.11.21 @ 13:30) >  The heart and lungs are normal. The bones are intact.    < end of copied text >      ASSESSMENT/PLAN: 	41y Male  PMH of HTN and anxiety, who presented to the ED due to palpitations new Afib after drinking more wine than usual.    - Echo  - If echo with no pertinent findings plan for stress  - Final CVA risk stratification pending above    I once again thank you for allowing me to participate in the care of your patient.  If you have any questions or concerns please do not hesitate to contact me.    Narinder Kovacs MD, [facility]  BEEPER [phone]

## 2021-12-13 ENCOUNTER — TRANSCRIPTION ENCOUNTER (OUTPATIENT)
Age: 41
End: 2021-12-13

## 2021-12-13 VITALS
TEMPERATURE: 98 F | RESPIRATION RATE: 17 BRPM | DIASTOLIC BLOOD PRESSURE: 88 MMHG | HEART RATE: 77 BPM | SYSTOLIC BLOOD PRESSURE: 126 MMHG | OXYGEN SATURATION: 98 %

## 2021-12-13 LAB
ANION GAP SERPL CALC-SCNC: 6 MMOL/L — SIGNIFICANT CHANGE UP (ref 5–17)
BASOPHILS # BLD AUTO: 0.06 K/UL — SIGNIFICANT CHANGE UP (ref 0–0.2)
BASOPHILS NFR BLD AUTO: 0.9 % — SIGNIFICANT CHANGE UP (ref 0–2)
BUN SERPL-MCNC: 10 MG/DL — SIGNIFICANT CHANGE UP (ref 7–18)
CALCIUM SERPL-MCNC: 8.9 MG/DL — SIGNIFICANT CHANGE UP (ref 8.4–10.5)
CHLORIDE SERPL-SCNC: 104 MMOL/L — SIGNIFICANT CHANGE UP (ref 96–108)
CO2 SERPL-SCNC: 28 MMOL/L — SIGNIFICANT CHANGE UP (ref 22–31)
CREAT SERPL-MCNC: 1.04 MG/DL — SIGNIFICANT CHANGE UP (ref 0.5–1.3)
EOSINOPHIL # BLD AUTO: 0.08 K/UL — SIGNIFICANT CHANGE UP (ref 0–0.5)
EOSINOPHIL NFR BLD AUTO: 1.1 % — SIGNIFICANT CHANGE UP (ref 0–6)
GLUCOSE SERPL-MCNC: 97 MG/DL — SIGNIFICANT CHANGE UP (ref 70–99)
HCT VFR BLD CALC: 46.3 % — SIGNIFICANT CHANGE UP (ref 39–50)
HGB BLD-MCNC: 15.5 G/DL — SIGNIFICANT CHANGE UP (ref 13–17)
IMM GRANULOCYTES NFR BLD AUTO: 0.6 % — SIGNIFICANT CHANGE UP (ref 0–1.5)
LYMPHOCYTES # BLD AUTO: 1.4 K/UL — SIGNIFICANT CHANGE UP (ref 1–3.3)
LYMPHOCYTES # BLD AUTO: 20 % — SIGNIFICANT CHANGE UP (ref 13–44)
MAGNESIUM SERPL-MCNC: 2.4 MG/DL — SIGNIFICANT CHANGE UP (ref 1.6–2.6)
MCHC RBC-ENTMCNC: 31.2 PG — SIGNIFICANT CHANGE UP (ref 27–34)
MCHC RBC-ENTMCNC: 33.5 GM/DL — SIGNIFICANT CHANGE UP (ref 32–36)
MCV RBC AUTO: 93.2 FL — SIGNIFICANT CHANGE UP (ref 80–100)
MONOCYTES # BLD AUTO: 0.85 K/UL — SIGNIFICANT CHANGE UP (ref 0–0.9)
MONOCYTES NFR BLD AUTO: 12.1 % — SIGNIFICANT CHANGE UP (ref 2–14)
NEUTROPHILS # BLD AUTO: 4.57 K/UL — SIGNIFICANT CHANGE UP (ref 1.8–7.4)
NEUTROPHILS NFR BLD AUTO: 65.3 % — SIGNIFICANT CHANGE UP (ref 43–77)
NRBC # BLD: 0 /100 WBCS — SIGNIFICANT CHANGE UP (ref 0–0)
PHOSPHATE SERPL-MCNC: 3.5 MG/DL — SIGNIFICANT CHANGE UP (ref 2.5–4.5)
PLATELET # BLD AUTO: 228 K/UL — SIGNIFICANT CHANGE UP (ref 150–400)
POTASSIUM SERPL-MCNC: 3.6 MMOL/L — SIGNIFICANT CHANGE UP (ref 3.5–5.3)
POTASSIUM SERPL-SCNC: 3.6 MMOL/L — SIGNIFICANT CHANGE UP (ref 3.5–5.3)
RBC # BLD: 4.97 M/UL — SIGNIFICANT CHANGE UP (ref 4.2–5.8)
RBC # FLD: 13.4 % — SIGNIFICANT CHANGE UP (ref 10.3–14.5)
SODIUM SERPL-SCNC: 138 MMOL/L — SIGNIFICANT CHANGE UP (ref 135–145)
WBC # BLD: 7 K/UL — SIGNIFICANT CHANGE UP (ref 3.8–10.5)
WBC # FLD AUTO: 7 K/UL — SIGNIFICANT CHANGE UP (ref 3.8–10.5)

## 2021-12-13 RX ORDER — ALPRAZOLAM 0.25 MG
1 TABLET ORAL
Qty: 0 | Refills: 0 | DISCHARGE

## 2021-12-13 RX ORDER — ASPIRIN/CALCIUM CARB/MAGNESIUM 324 MG
1 TABLET ORAL
Qty: 14 | Refills: 0
Start: 2021-12-13 | End: 2021-12-26

## 2021-12-13 RX ORDER — METOPROLOL TARTRATE 50 MG
1 TABLET ORAL
Qty: 28 | Refills: 0
Start: 2021-12-13 | End: 2021-12-26

## 2021-12-13 RX ORDER — LISINOPRIL 2.5 MG/1
1 TABLET ORAL
Qty: 0 | Refills: 0 | DISCHARGE

## 2021-12-13 RX ORDER — ALPRAZOLAM 0.25 MG
1 TABLET ORAL
Qty: 42 | Refills: 0
Start: 2021-12-13 | End: 2021-12-26

## 2021-12-13 RX ADMIN — Medication 50 MILLIGRAM(S): at 06:23

## 2021-12-13 RX ADMIN — Medication 0.25 MILLIGRAM(S): at 06:25

## 2021-12-13 RX ADMIN — Medication 81 MILLIGRAM(S): at 12:41

## 2021-12-13 RX ADMIN — Medication 50 MILLIGRAM(S): at 17:57

## 2021-12-13 NOTE — DISCHARGE NOTE NURSING/CASE MANAGEMENT/SOCIAL WORK - PATIENT PORTAL LINK FT
You can access the FollowMyHealth Patient Portal offered by [facility] by registering at the following website: [email or web address]. By joining [facility]’s FollowMyHealth portal, you will also be able to view your health information using other applications (apps) compatible with our system.

## 2021-12-13 NOTE — DISCHARGE NOTE PROVIDER - PROVIDER TOKENS
FREE:[LAST:[Mares],FIRST:[Alfonzo],PHONE:[(   )    -],FAX:[(   )    -],FOLLOWUP:[Routine],ESTABLISHEDPATIENT:[T]],PROVIDER:[TOKEN:[[id number]],FOLLOWUP:[Routine]]

## 2021-12-13 NOTE — DISCHARGE NOTE PROVIDER - CARE PROVIDER_API CALL
Alfonzo Mares  Phone: (   )    -  Fax: (   )    -  Established Patient  Follow Up Time: Routine    Narinder Kovacs)  Cardiovascular Disease  [facility] [address]  Phone: [phone]  Fax: [phone]  Follow Up Time: Routine

## 2021-12-13 NOTE — DISCHARGE NOTE PROVIDER - HOSPITAL COURSE
41 year old male, with PMH of HTN and anxiety, who presented to the ED due to palpitations. His apple watch told him he his heart rate was 180s-190s and that he is in Afib. He also began feeling short of breath that he attributes to anxiety. Pt has had on and off episodes of palpitations since september. He called EMS on a previous occasion but his symptoms resolved and EKG was normal by the time they arrived. 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 Patient was admitted for cardiac monitoring and management of A fib. TSH and free T4 were within normal limits. Troponin was negative. Cr on admission was 1.31; lisinopril was held and IV fluids were started. Cr normalized soon after. Cardiology was consulted and recommended stress test and echo. The echo showed normal left ventricular function, and the stress test showed no ischemia. Cardiology recommended 325mg of Asprin for Afib until he has insurance coverage for novel therapeutic agents. On 12/13, the patient asked to be discharged due to family and work commitments the following day. 41 year old male, with PMH of HTN and anxiety, who presented to the ED due to palpitations. His apple watch told him he his heart rate was 180s-190s and that he is in Afib. He also began feeling short of breath that he attributes to anxiety. Pt has had on and off episodes of palpitations since september. He called EMS on a previous occasion but his symptoms resolved and EKG was normal by the time they arrived. 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 Patient was admitted for cardiac monitoring and management of A fib. TSH and free T4 were within normal limits. Troponin was negative. Cr on admission was 1.31; lisinopril was held and IV fluids were started. Cr normalized soon after. Cardiology was consulted and recommended stress test and echo. The echo showed normal left ventricular function, and the stress test showed no ischemia. Cardiology recommended 325mg of Asprin for Afib until he has insurance coverage for novel therapeutic agents. On 12/13, the patient asked to be discharged due to family and work commitments the following day.    Patient notified that in order to  his VIVO meds tomorrow after his discharge this evening he needs to present his DC papers and ID to security and  his medications at this pharmacy. Nursing manager and Pharmacy are aware and approved of plan. Patient understands plan and is agreeable.

## 2021-12-13 NOTE — SBIRT NOTE ADULT - NSSBIRTALCPOSREINDET_GEN_A_CORE
SW intern provided positive reinforcement, reminded pt to remain their awareness of the dangers of overconsumption of alcohol.

## 2021-12-13 NOTE — DISCHARGE NOTE PROVIDER - NSDCCPCAREPLAN_GEN_ALL_CORE_FT
PRINCIPAL DISCHARGE DIAGNOSIS  Diagnosis: New onset atrial fibrillation  Assessment and Plan of Treatment: Atrial fibrillation (A-fib or AF) is the most common type of sustained cardiac arrhythmia. It occurs when there are too many electrical signals that normally control the heartbeat, causing the upper chambers of the heart (the atria) to beat extremely rapidly (more than 400 beats per minute) and quiver (fibrillate). We gave you high strength blood thinners to prevent any embolic stroke. Your ECHO and stress test were negative. You will be dischaged with 325mg of asprin daily until your insurace coverage is activated. Please follow up with your cardiologist when you have coverage to initate and optimize anticoagulation coverage.       PRINCIPAL DISCHARGE DIAGNOSIS  Diagnosis: New onset atrial fibrillation  Assessment and Plan of Treatment: Atrial fibrillation (A-fib or AF) is the most common type of sustained cardiac arrhythmia. It occurs when there are too many electrical signals that normally control the heartbeat, causing the upper chambers of the heart (the atria) to beat extremely rapidly (more than 400 beats per minute) and quiver (fibrillate). We gave you high strength blood thinners to prevent any embolic stroke. Your ECHO and stress test were negative. You will be dischaged with 325mg of asprin daily until your insurace coverage is activated. Please follow up with your cardiologist when you have coverage to initate and optimize anticoagulation coverage.  In order to  your VIVO medications tomorrow from the pharmcy at [facility] after your discharge today you need to present your ID and your discharge papers to security so that you will be allowed to re-enter and  your meds at the pharmacy.

## 2021-12-13 NOTE — DISCHARGE NOTE NURSING/CASE MANAGEMENT/SOCIAL WORK - NSDCPEFALRISK_GEN_ALL_CORE
For information on Fall & Injury Prevention, visit: [email or web address] OR  [email or web address] OR  https://www.cdc.gov/steadi/patient.html

## 2021-12-13 NOTE — DISCHARGE NOTE NURSING/CASE MANAGEMENT/SOCIAL WORK - NSDCVIVACCINE_GEN_ALL_CORE_FT
Tdap; 13-Dec-2020 18:18; Mackenzie Goins (CHE); [employer]; [id number] (Exp. Date: 31-Jul-2022); IntraMuscular; Deltoid Right.; 0.5 milliLiter(s); VIS (VIS Published: 09-May-2013, VIS Presented: 13-Dec-2020);

## 2021-12-13 NOTE — PROGRESS NOTE ADULT - SUBJECTIVE AND OBJECTIVE BOX
C A R D I O L O G Y  **********************************     DATE OF SERVICE: 12-13-21    Patient denies chest pain or shortness of breath.   Review of systems otherwise (-)  	  MEDICATIONS:  MEDICATIONS  (STANDING):  aspirin  chewable 81 milliGRAM(s) Oral daily  enoxaparin Injectable 90 milliGRAM(s) SubCutaneous every 12 hours  influenza   Vaccine 0.5 milliLiter(s) IntraMuscular once  metoprolol tartrate 50 milliGRAM(s) Oral two times a day  sodium chloride 0.9%. 1000 milliLiter(s) (80 mL/Hr) IV Continuous <Continuous>      LABS:	 	    CARDIAC MARKERS:        Troponin I, High Sensitivity Result: 6.2 ng/L (12-11-21 @ 12:48)                              15.5   7.00  )-----------( 228      ( 13 Dec 2021 07:51 )             46.3     Hemoglobin: 15.5 g/dL (12-13 @ 07:51)  Hemoglobin: 15.6 g/dL (12-12 @ 07:55)  Hemoglobin: 15.0 g/dL (12-11 @ 12:48)      12-13    138  |  104  |  10  ----------------------------<  97  3.6   |  28  |  1.04    Ca    8.9      13 Dec 2021 07:51  Phos  3.5     12-13  Mg     2.4     12-13    TPro  6.9  /  Alb  3.2<L>  /  TBili  0.5  /  DBili  x   /  AST  40  /  ALT  56  /  AlkPhos  65  12-11    Creatinine Trend: 1.04<--, 1.11<--, 1.31<--      PHYSICAL EXAM:  T(C): 36.6 (12-13-21 @ 08:45), Max: 36.7 (12-13-21 @ 05:20)  HR: 75 (12-13-21 @ 05:20) (71 - 76)  BP: 129/82 (12-13-21 @ 08:45) (123/80 - 129/82)  RR: 17 (12-13-21 @ 08:45) (16 - 17)  SpO2: 97% (12-13-21 @ 08:45) (97% - 98%)  Wt(kg): --  I&O's Summary        HEENT:  (-)icterus (-)pallor  CV: N S1 S2 1/6 ROBERT (+)2 Pulses B/l  Resp:  Clear to ausculatation B/L, normal effort  GI: (+) BS Soft, NT, ND  Lymph:  (-)Edema, (-)obvious lymphadenopathy  Skin: Warm to touch, Normal turgor  Psych: Appropriate mood and affect      TELEMETRY: 	  Sinus        ASSESSMENT/PLAN: 	41y  Male  PMH of HTN and anxiety, who presented to the ED due to palpitations new Afib after drinking more wine than usual.    - Echo with normal LV function  - Stress with no ischemia  - Given his history of HTN he has a CHADS-VASC score 0f 1, which protends a 1.3 % annual stroke risk.  At this level of risk either an ASA or Anti-thrombotic therapy is appropriate.  I have discussed these options with the patient, Knowing that anticoagulation would decrease his risk by 50-80% vs about 20% for ASA he would like to take a novel agent once he has medication coverage.    - Cont Beta blocker  - Can restart Lisinopril    Narinder Kovacs MD, [facility]  BEEPER [phone]      
Date of Service  : 21 @ 16:02    INTERVAL HPI/OVERNIGHT EVENTS: Seen and examined earlier today . I Feel better.   Vital Signs Last 24 Hrs  T(C): 36.6 (12 Dec 2021 15:59), Max: 36.9 (12 Dec 2021 10:19)  T(F): 97.8 (12 Dec 2021 15:59), Max: 98.4 (12 Dec 2021 10:19)  HR: 71 (12 Dec 2021 15:59) (71 - 104)  BP: 126/78 (12 Dec 2021 15:59) (105/64 - 136/99)  BP(mean): --  RR: 16 (12 Dec 2021 15:59) (16 - 19)  SpO2: 98% (12 Dec 2021 15:59) (96% - 100%)  I&O's Summary    MEDICATIONS  (STANDING):  aspirin  chewable 81 milliGRAM(s) Oral daily  enoxaparin Injectable 90 milliGRAM(s) SubCutaneous every 12 hours  influenza   Vaccine 0.5 milliLiter(s) IntraMuscular once  metoprolol tartrate 50 milliGRAM(s) Oral two times a day  sodium chloride 0.9%. 1000 milliLiter(s) (80 mL/Hr) IV Continuous <Continuous>    MEDICATIONS  (PRN):  acetaminophen     Tablet .. 650 milliGRAM(s) Oral every 6 hours PRN Temp greater or equal to 38C (100.4F), Moderate Pain (4 - 6)  ALPRAZolam 0.25 milliGRAM(s) Oral three times a day PRN anxiety  LORazepam   Injectable 2 milliGRAM(s) IV Push every 2 hours PRN CIWA >7  ondansetron Injectable 4 milliGRAM(s) IV Push every 6 hours PRN Nausea and/or Vomiting    LABS:                        15.6   9.53  )-----------( 242      ( 12 Dec 2021 07:55 )             47.7     12-12    139  |  102  |  11  ----------------------------<  103<H>  4.0   |  29  |  1.11    Ca    8.9      12 Dec 2021 07:55  Phos  3.4     12-12  Mg     2.6     12-12    TPro  6.9  /  Alb  3.2<L>  /  TBili  0.5  /  DBili  x   /  AST  40  /  ALT  56  /  AlkPhos  65  12-11    PT/INR - ( 11 Dec 2021 12:48 )   PT: 11.4 sec;   INR: 0.96 ratio         PTT - ( 11 Dec 2021 12:48 )  PTT:30.8 sec  Urinalysis Basic - ( 11 Dec 2021 13:18 )    Color: Yellow / Appearance: Clear / S.015 / pH: x  Gluc: x / Ketone: Trace  / Bili: Negative / Urobili: Negative   Blood: x / Protein: 15 / Nitrite: Negative   Leuk Esterase: Negative / RBC: 0-2 /HPF / WBC 0-2 /HPF   Sq Epi: x / Non Sq Epi: Negative /HPF / Bacteria: Negative /HPF      CAPILLARY BLOOD GLUCOSE            Urinalysis Basic - ( 11 Dec 2021 13:18 )    Color: Yellow / Appearance: Clear / S.015 / pH: x  Gluc: x / Ketone: Trace  / Bili: Negative / Urobili: Negative   Blood: x / Protein: 15 / Nitrite: Negative   Leuk Esterase: Negative / RBC: 0-2 /HPF / WBC 0-2 /HPF   Sq Epi: x / Non Sq Epi: Negative /HPF / Bacteria: Negative /HPF          Consultant(s) Notes Reviewed:  [x ] YES  [ ] NO    PHYSICAL EXAM:  GENERAL: NAD, well-groomed, well-developed,not in any distress ,  HEAD:  Atraumatic, Normocephalic  EYES: EOMI, PERRLA, conjunctiva and sclera clear  ENMT: No tonsillar erythema, exudates, or enlargement; Moist mucous membranes, Good dentition, No lesions  NECK: Supple, No JVD, Normal thyroid  NERVOUS SYSTEM:  Alert & Oriented X3, No focal deficit   CHEST/LUNG: Good air entry bilateral with no  rales, rhonchi, wheezing, or rubs  HEART: Irregular rate and rhythm; No murmurs, rubs, or gallops  ABDOMEN: Soft, Nontender, Nondistended; Bowel sounds present  EXTREMITIES:  2+ Peripheral Pulses, No clubbing, cyanosis, or edema  SKIN: No rashes or lesions    Care Discussed with Consultants/Other Providers [ x] YES  [ ] NO

## 2021-12-13 NOTE — SBIRT NOTE ADULT - NSSBIRTDRGPOSREINDET_GEN_A_CORE
SW intern provided positive reinforcement, reminded pt to remain their awareness of the dangers of use of non-prescription substances.

## 2021-12-16 RX ORDER — AMLODIPINE BESYLATE 2.5 MG/1
1 TABLET ORAL
Qty: 0 | Refills: 0 | DISCHARGE

## 2021-12-16 RX ORDER — AMLODIPINE BESYLATE 2.5 MG/1
1 TABLET ORAL
Qty: 30 | Refills: 0
Start: 2021-12-16 | End: 2022-01-14

## 2022-01-06 PROCEDURE — 83735 ASSAY OF MAGNESIUM: CPT

## 2022-01-06 PROCEDURE — 83690 ASSAY OF LIPASE: CPT

## 2022-01-06 PROCEDURE — 81001 URINALYSIS AUTO W/SCOPE: CPT

## 2022-01-06 PROCEDURE — 80053 COMPREHEN METABOLIC PANEL: CPT

## 2022-01-06 PROCEDURE — 85025 COMPLETE CBC W/AUTO DIFF WBC: CPT

## 2022-01-06 PROCEDURE — 84436 ASSAY OF TOTAL THYROXINE: CPT

## 2022-01-06 PROCEDURE — 84480 ASSAY TRIIODOTHYRONINE (T3): CPT

## 2022-01-06 PROCEDURE — 80061 LIPID PANEL: CPT

## 2022-01-06 PROCEDURE — 78452 HT MUSCLE IMAGE SPECT MULT: CPT

## 2022-01-06 PROCEDURE — 84484 ASSAY OF TROPONIN QUANT: CPT

## 2022-01-06 PROCEDURE — 80048 BASIC METABOLIC PNL TOTAL CA: CPT

## 2022-01-06 PROCEDURE — 85730 THROMBOPLASTIN TIME PARTIAL: CPT

## 2022-01-06 PROCEDURE — 87635 SARS-COV-2 COVID-19 AMP PRB: CPT

## 2022-01-06 PROCEDURE — 93306 TTE W/DOPPLER COMPLETE: CPT

## 2022-01-06 PROCEDURE — 84100 ASSAY OF PHOSPHORUS: CPT

## 2022-01-06 PROCEDURE — 99285 EMERGENCY DEPT VISIT HI MDM: CPT

## 2022-01-06 PROCEDURE — 84443 ASSAY THYROID STIM HORMONE: CPT

## 2022-01-06 PROCEDURE — A9502: CPT

## 2022-01-06 PROCEDURE — 83036 HEMOGLOBIN GLYCOSYLATED A1C: CPT

## 2022-01-06 PROCEDURE — 71045 X-RAY EXAM CHEST 1 VIEW: CPT

## 2022-01-06 PROCEDURE — 36415 COLL VENOUS BLD VENIPUNCTURE: CPT

## 2022-01-06 PROCEDURE — 80307 DRUG TEST PRSMV CHEM ANLYZR: CPT

## 2022-01-06 PROCEDURE — 93005 ELECTROCARDIOGRAM TRACING: CPT

## 2022-01-06 PROCEDURE — 85610 PROTHROMBIN TIME: CPT

## 2022-01-06 PROCEDURE — 93017 CV STRESS TEST TRACING ONLY: CPT

## 2022-03-19 NOTE — SBIRT NOTE ADULT - NSSBIRTDRGUSEDOTHTHAN_GEN_A_CORE
CT head/spine ordered, EKG, x-ray hip/right shoulder, bed alarm on, CBC/BMP, fingerstick RRT called, CT head/spine ordered, EKG, x-ray hip/right shoulder, bed alarm on, CBC/BMP, fingerstick No

## 2022-06-14 NOTE — ED ADULT NURSE NOTE - HARM RISK FACTORS
Phoned patient, no answer, left message for patient to return call to anticoag clinic to discuss rescheduling 6/21 AAc appt.    no

## 2025-07-03 ENCOUNTER — NON-APPOINTMENT (OUTPATIENT)
Age: 45
End: 2025-07-03

## 2025-08-25 ENCOUNTER — TRANSCRIPTION ENCOUNTER (OUTPATIENT)
Age: 45
End: 2025-08-25

## 2025-08-25 ENCOUNTER — EMERGENCY (EMERGENCY)
Facility: HOSPITAL | Age: 45
LOS: 1 days | End: 2025-08-25
Attending: STUDENT IN AN ORGANIZED HEALTH CARE EDUCATION/TRAINING PROGRAM
Payer: SELF-PAY

## 2025-08-25 VITALS
RESPIRATION RATE: 17 BRPM | SYSTOLIC BLOOD PRESSURE: 135 MMHG | HEART RATE: 71 BPM | DIASTOLIC BLOOD PRESSURE: 87 MMHG | OXYGEN SATURATION: 96 % | TEMPERATURE: 98 F

## 2025-08-25 VITALS
RESPIRATION RATE: 18 BRPM | OXYGEN SATURATION: 95 % | WEIGHT: 220.02 LBS | DIASTOLIC BLOOD PRESSURE: 97 MMHG | TEMPERATURE: 98 F | HEART RATE: 94 BPM | HEIGHT: 73 IN | SYSTOLIC BLOOD PRESSURE: 142 MMHG

## 2025-08-25 DIAGNOSIS — Z98.890 OTHER SPECIFIED POSTPROCEDURAL STATES: Chronic | ICD-10-CM

## 2025-08-25 DIAGNOSIS — T50.901A POISONING BY UNSPECIFIED DRUGS, MEDICAMENTS AND BIOLOGICAL SUBSTANCES, ACCIDENTAL (UNINTENTIONAL), INITIAL ENCOUNTER: ICD-10-CM

## 2025-08-25 PROBLEM — F41.9 ANXIETY DISORDER, UNSPECIFIED: Chronic | Status: ACTIVE | Noted: 2021-12-11

## 2025-08-25 PROCEDURE — 99285 EMERGENCY DEPT VISIT HI MDM: CPT | Mod: 25

## 2025-08-25 PROCEDURE — 99285 EMERGENCY DEPT VISIT HI MDM: CPT

## 2025-08-25 PROCEDURE — 99053 MED SERV 10PM-8AM 24 HR FAC: CPT

## 2025-08-25 PROCEDURE — 93010 ELECTROCARDIOGRAM REPORT: CPT

## 2025-08-25 PROCEDURE — 93005 ELECTROCARDIOGRAM TRACING: CPT

## 2025-08-25 RX ORDER — HYDROCODONE BITARTRATE AND ACETAMINOPHEN 5; 325 MG/1; MG/1
1 TABLET ORAL
Refills: 0
Start: 2025-08-25